# Patient Record
Sex: FEMALE | Race: OTHER | HISPANIC OR LATINO | Employment: OTHER | URBAN - METROPOLITAN AREA
[De-identification: names, ages, dates, MRNs, and addresses within clinical notes are randomized per-mention and may not be internally consistent; named-entity substitution may affect disease eponyms.]

---

## 2019-07-14 ENCOUNTER — APPOINTMENT (EMERGENCY)
Dept: RADIOLOGY | Facility: HOSPITAL | Age: 49
End: 2019-07-14
Payer: COMMERCIAL

## 2019-07-14 ENCOUNTER — HOSPITAL ENCOUNTER (EMERGENCY)
Facility: HOSPITAL | Age: 49
Discharge: HOME | End: 2019-07-14
Attending: EMERGENCY MEDICINE
Payer: COMMERCIAL

## 2019-07-14 VITALS
HEART RATE: 90 BPM | OXYGEN SATURATION: 100 % | SYSTOLIC BLOOD PRESSURE: 144 MMHG | RESPIRATION RATE: 16 BRPM | DIASTOLIC BLOOD PRESSURE: 85 MMHG | TEMPERATURE: 98.8 F

## 2019-07-14 DIAGNOSIS — M25.561 RIGHT KNEE PAIN: Primary | ICD-10-CM

## 2019-07-14 PROCEDURE — 99283 EMERGENCY DEPT VISIT LOW MDM: CPT | Performed by: PHYSICIAN ASSISTANT

## 2019-07-14 PROCEDURE — 99283 EMERGENCY DEPT VISIT LOW MDM: CPT

## 2019-07-14 PROCEDURE — 73564 X-RAY EXAM KNEE 4 OR MORE: CPT

## 2019-07-14 RX ORDER — IBUPROFEN 400 MG/1
400 TABLET ORAL ONCE
Status: COMPLETED | OUTPATIENT
Start: 2019-07-14 | End: 2019-07-14

## 2019-07-14 RX ADMIN — IBUPROFEN 400 MG: 400 TABLET ORAL at 15:11

## 2019-07-14 NOTE — ED PROVIDER NOTES
History  Chief Complaint   Patient presents with    Knee Pain     [place] and reports pain and swelling in right knee  Denies striking head  Patient is a 19-year-old female with past medical history of asthma, dermatosis, fibromyalgia who presents for evaluation of knee pain  She states that she has bad knees"for many years  She states that sometimes they feel like they will give out on her  She did see someone for this at one time and did physical therapy  She has not seen anybody recently  She states that [place] into her and caused her to twist her right knee  She denies any fall onto the knee  She has pain and swelling located to the medial aspect  She tried ice and a pain cream with minimal relief  She has limited range of motion and limited ability to weight bear due to pain  She is using a Velcro brace with minimal relief  She is not taking any medications for symptoms  She denies any history of fracture or surgery  She denies other new joint pains  She denies fever, chills, nausea vomiting diarrhea, chest pain, shortness breath, abdominal pain, numbness or weakness  None       Past Medical History:   Diagnosis Date    Asthma     Dermatosis     Fibromyalgia        History reviewed  No pertinent surgical history  History reviewed  No pertinent family history  I have reviewed and agree with the history as documented  Social History     Tobacco Use    Smoking status: Never Smoker    Smokeless tobacco: Never Used   Substance Use Topics    Alcohol use: Never     Frequency: Never    Drug use: Never        Review of Systems   Constitutional: Negative for chills and fever  Respiratory: Negative for shortness of breath  Cardiovascular: Negative for chest pain  Gastrointestinal: Negative for abdominal pain, diarrhea, nausea and vomiting     Musculoskeletal: Positive for arthralgias and joint swelling  Skin: Negative for rash and wound  Neurological: Negative for weakness and numbness  All other systems reviewed and are negative  Physical Exam  Physical Exam   Constitutional: She appears well-developed and well-nourished  No distress  HENT:   Head: Normocephalic and atraumatic  Right Ear: External ear normal    Left Ear: External ear normal    Nose: Nose normal    Eyes: EOM are normal    Neck: Normal range of motion  Cardiovascular: Normal rate, regular rhythm and normal heart sounds  Exam reveals no gallop and no friction rub  No murmur heard  Pulmonary/Chest: Effort normal and breath sounds normal  No stridor  No respiratory distress  She has no wheezes  Musculoskeletal:        Right knee: She exhibits decreased range of motion and swelling  She exhibits no effusion, no ecchymosis, no deformity, no laceration, no erythema, normal alignment and normal patellar mobility  Tenderness found  Medial joint line tenderness noted  No lateral joint line, no MCL, no LCL and no patellar tendon tenderness noted  Right knee with tenderness palpation over the medial aspect and medial joint line  There is mild swelling  Range of motion is slightly limited due to pain  Extension and flexion intact  No obvious ligamentous instability  No deformity  Neurovascular intact distally  Pedal pulses intact  Capillary refill intact  Neurological: She is alert  Skin: Skin is warm and dry  Capillary refill takes less than 2 seconds  She is not diaphoretic  Psychiatric: She has a normal mood and affect  Her behavior is normal    Nursing note and vitals reviewed        Vital Signs  ED Triage Vitals [07/14/19 1412]   Temperature Pulse Respirations Blood Pressure SpO2   98 8 °F (37 1 °C) 83 16 (!) 171/102 100 %      Temp Source Heart Rate Source Patient Position - Orthostatic VS BP Location FiO2 (%)   Oral Monitor Sitting Right arm --      Pain Score       Worst Possible Pain           Vitals: 07/14/19 1412 07/14/19 1513   BP: (!) 171/102 144/85   Pulse: 83 90   Patient Position - Orthostatic VS: Sitting Sitting         Visual Acuity      ED Medications  Medications   ibuprofen (MOTRIN) tablet 400 mg (400 mg Oral Given 7/14/19 1511)       Diagnostic Studies  Results Reviewed     None                 XR knee 4+ vw right injury    (Results Pending)              Procedures  Procedures       ED Course                               MDM  Number of Diagnoses or Management Options  Right knee pain:   Diagnosis management comments: X-ray of the right knee reviewed by me and interpreted as no acute bony abnormality  Discussed results with patient  Explained that x-rays will be further read by radiologist and if any discrepancies arise she will be contacted  Discussed likely knee sprain or possible meniscal injury  Will give Motrin here  Will apply a knee immobilizer for comfort  Discussed proper usage  Given a cane for support with ambulation  Advised rest, ice, elevation  Instructed to follow up with family doctor  Given contact information for the [facility] as well as Orthopedics  Return to the ED if symptoms worsen or new symptoms arise  Patient states understanding and agrees with plan  Amount and/or Complexity of Data Reviewed  Tests in the radiology section of CPT®: ordered and reviewed  Independent visualization of images, tracings, or specimens: yes    Patient Progress  Patient progress: stable      Disposition  Final diagnoses:   Right knee pain     Time reflects when diagnosis was documented in both MDM as applicable and the Disposition within this note     Time User Action Codes Description Comment    7/14/2019  3:06 PM Erin Ware Right knee pain       ED Disposition     ED Disposition Condition Date/Time Comment    Discharge Stable Sun Jul 14, 2019  3:06 PM Jameel Aragon discharge to home/self care              Follow-up Information     Follow up With Specialties Details Why Contact Info Additional [address] [facility] Family Medicine Schedule an appointment as soon as possible for a visit in 1 day  [address] [facility] [address]  [address] [facility],[address] Floor [facility]  [facility] [address], [place], [address]    [address] Orthopedic Surgery Schedule an appointment as soon as possible for a visit in 1 day  [address] [facility] [address] [facility] [address]  [address] [place] [address], [address] [place] [address],  [address] [place] [address], [phone]    [facility] Emergency Department Emergency Medicine  If symptoms worsen [facility] [address] [phone] [place] ED, [address] [place], [place], [address]          There are no discharge medications for this patient  No discharge procedures on file      ED Provider  Electronically Signed by           Willy Fu PA-C  07/14/19 1892

## 2020-09-01 ENCOUNTER — TELEPHONE (OUTPATIENT)
Dept: FAMILY MEDICINE CLINIC | Facility: CLINIC | Age: 50
End: 2020-09-01

## 2020-09-01 NOTE — TELEPHONE ENCOUNTER
Spoke with this person who had called the [facility] Endocrine department to find out information about our diabetes classes [place] [facility] [place]  She was calling on behalf of a friend  I  Explained that the friend needs a Referral for Diabetes Education from a [facility] provider  She verbalized understanding    DDS